# Patient Record
Sex: MALE | Race: WHITE | NOT HISPANIC OR LATINO | Employment: FULL TIME | URBAN - METROPOLITAN AREA
[De-identification: names, ages, dates, MRNs, and addresses within clinical notes are randomized per-mention and may not be internally consistent; named-entity substitution may affect disease eponyms.]

---

## 2023-04-01 ENCOUNTER — HOSPITAL ENCOUNTER (EMERGENCY)
Facility: CLINIC | Age: 23
Discharge: HOME | End: 2023-04-01
Admitting: PHYSICIAN ASSISTANT
Payer: COMMERCIAL

## 2023-04-01 VITALS
SYSTOLIC BLOOD PRESSURE: 119 MMHG | BODY MASS INDEX: 21.4 KG/M2 | OXYGEN SATURATION: 98 % | HEIGHT: 70 IN | RESPIRATION RATE: 18 BRPM | TEMPERATURE: 98.4 F | WEIGHT: 149.5 LBS | HEART RATE: 100 BPM | DIASTOLIC BLOOD PRESSURE: 63 MMHG

## 2023-04-01 DIAGNOSIS — K52.9 GASTROENTERITIS: ICD-10-CM

## 2023-04-01 LAB
BASOPHILS # BLD AUTO: 0 10E3/UL (ref 0–0.2)
BASOPHILS NFR BLD AUTO: 0 %
CA-I BLD-MCNC: 4.8 MG/DL (ref 4.4–5.2)
CPB POCT: NO
CREAT BLD-MCNC: 0.9 MG/DL (ref 0.7–1.3)
EOSINOPHIL # BLD AUTO: 0.1 10E3/UL (ref 0–0.7)
EOSINOPHIL NFR BLD AUTO: 1 %
ERYTHROCYTE [DISTWIDTH] IN BLOOD BY AUTOMATED COUNT: 12 % (ref 10–15)
GFR SERPL CREATININE-BSD FRML MDRD: >60 ML/MIN/1.73M2
GLUCOSE BLD-MCNC: 95 MG/DL (ref 70–99)
HCO3 BLDV-SCNC: 24 MMOL/L (ref 21–28)
HCT VFR BLD AUTO: 44.7 % (ref 40–53)
HCT VFR BLD CALC: 45 % (ref 40–53)
HGB BLD-MCNC: 15.3 G/DL (ref 13.3–17.7)
HGB BLD-MCNC: 15.7 G/DL (ref 13.3–17.7)
IMM GRANULOCYTES # BLD: 0 10E3/UL
IMM GRANULOCYTES NFR BLD: 0 %
LYMPHOCYTES # BLD AUTO: 0.3 10E3/UL (ref 0.8–5.3)
LYMPHOCYTES NFR BLD AUTO: 3 %
MCH RBC QN AUTO: 30.1 PG (ref 26.5–33)
MCHC RBC AUTO-ENTMCNC: 35.1 G/DL (ref 31.5–36.5)
MCV RBC AUTO: 86 FL (ref 78–100)
MONOCYTES # BLD AUTO: 0.7 10E3/UL (ref 0–1.3)
MONOCYTES NFR BLD AUTO: 6 %
NEUTROPHILS # BLD AUTO: 10.5 10E3/UL (ref 1.6–8.3)
NEUTROPHILS NFR BLD AUTO: 90 %
NRBC # BLD AUTO: 0 10E3/UL
NRBC BLD AUTO-RTO: 0 /100
PCO2 BLDV: 37 MM HG (ref 40–50)
PH BLDV: 7.42 [PH] (ref 7.32–7.43)
PLATELET # BLD AUTO: 276 10E3/UL (ref 150–450)
PO2 BLDV: 25 MM HG (ref 25–47)
POTASSIUM BLD-SCNC: 3.5 MMOL/L (ref 3.4–5.3)
RBC # BLD AUTO: 5.22 10E6/UL (ref 4.4–5.9)
SAO2 % BLDV: 48 % (ref 94–100)
SODIUM BLD-SCNC: 141 MMOL/L (ref 133–144)
WBC # BLD AUTO: 11.6 10E3/UL (ref 4–11)

## 2023-04-01 PROCEDURE — 82330 ASSAY OF CALCIUM: CPT

## 2023-04-01 PROCEDURE — 82947 ASSAY GLUCOSE BLOOD QUANT: CPT

## 2023-04-01 PROCEDURE — 85025 COMPLETE CBC W/AUTO DIFF WBC: CPT | Performed by: PHYSICIAN ASSISTANT

## 2023-04-01 PROCEDURE — 96361 HYDRATE IV INFUSION ADD-ON: CPT | Performed by: PHYSICIAN ASSISTANT

## 2023-04-01 PROCEDURE — 82565 ASSAY OF CREATININE: CPT

## 2023-04-01 PROCEDURE — 96374 THER/PROPH/DIAG INJ IV PUSH: CPT | Performed by: PHYSICIAN ASSISTANT

## 2023-04-01 PROCEDURE — 258N000003 HC RX IP 258 OP 636: Performed by: PHYSICIAN ASSISTANT

## 2023-04-01 PROCEDURE — 99284 EMERGENCY DEPT VISIT MOD MDM: CPT | Mod: 25 | Performed by: PHYSICIAN ASSISTANT

## 2023-04-01 PROCEDURE — 99284 EMERGENCY DEPT VISIT MOD MDM: CPT | Performed by: PHYSICIAN ASSISTANT

## 2023-04-01 PROCEDURE — 36415 COLL VENOUS BLD VENIPUNCTURE: CPT | Performed by: PHYSICIAN ASSISTANT

## 2023-04-01 PROCEDURE — 250N000011 HC RX IP 250 OP 636: Performed by: PHYSICIAN ASSISTANT

## 2023-04-01 RX ORDER — ONDANSETRON 2 MG/ML
4 INJECTION INTRAMUSCULAR; INTRAVENOUS ONCE
Status: COMPLETED | OUTPATIENT
Start: 2023-04-01 | End: 2023-04-01

## 2023-04-01 RX ORDER — ONDANSETRON 4 MG/1
4 TABLET, ORALLY DISINTEGRATING ORAL EVERY 8 HOURS PRN
Qty: 6 TABLET | Refills: 0 | Status: SHIPPED | OUTPATIENT
Start: 2023-04-01 | End: 2023-04-03

## 2023-04-01 RX ADMIN — SODIUM CHLORIDE 1000 ML: 9 INJECTION, SOLUTION INTRAVENOUS at 15:07

## 2023-04-01 RX ADMIN — ONDANSETRON 4 MG: 2 INJECTION INTRAMUSCULAR; INTRAVENOUS at 15:07

## 2023-04-01 ASSESSMENT — ACTIVITIES OF DAILY LIVING (ADL)
ADLS_ACUITY_SCORE: 35
ADLS_ACUITY_SCORE: 35

## 2023-04-01 NOTE — ED PROVIDER NOTES
"ED Provider Note  [facility]      History     Chief Complaint   Patient presents with     Nausea, Vomiting, & Diarrhea     Pt just got back from [place] states he thinks he has HRBoss revenGVISP 1     HPI  Tesfaye Almanza is a 22 year old male who presents emergency department tonight with concerns for nausea vomiting and diarrhea.  Patient states that 2 days ago he got back from [place], where he had been drinking alcohol, as well as may be drinking some water from the tap in [place].  He states that this morning he woke up with new onset nausea vomiting and diarrhea.  He states that he has been unable to keep any solids or liquids down and did try drinking some water and Gatorade, which he did throw up.  He states that his stool is \"pure liquid\" without any bloody or black stools.  Is not having any hematemesis with the symptoms.  He does report some more generalized abdominal pain with his symptoms particularly more upper abdominal pain with vomiting.  No fevers with the symptoms.  Denies any cough or sore throat, runny nose.  Patient denies any recent alcohol use, states that he last had alcohol about 2 days ago.  He denies chronic medical problems.             Physical Exam   BP: 114/69  Pulse: 106  Temp: 98.4  F (36.9  C)  Resp: 18  Height: 177.8 cm (5' 10\")  Weight: 67.8 kg (149 lb 8 oz)  SpO2: 99 %  Physical Exam  GENERAL APPEARANCE: The patient is well developed, well appearing, and in no acute distress.  HEAD:  Normocephalic and atraumatic.   EENT: Voice normal.  NECK: Trachea is midline.No lymphadenopathy or tenderness.  LUNGS: Breath sounds are equal and clear bilaterally. No wheezes, rhonchi, or rales.  HEART: Regular rate and normal rhythm.  Radial pulses 2+ bilaterally.  ABDOMEN: Soft, flat, and benign. No mass, tenderness, guarding, or rebound.Bowel sounds are present.  EXTREMITIES: No cyanosis, clubbing, or edema.  NEUROLOGIC: No focal sensory or motor deficits are " noted.  PSYCHIATRIC: The patient is awake, alert.  Appropriate mood and affect.  SKIN: Warm, dry, and well perfused. Good turgor.      ED Course, Procedures, & Data      Procedures                      Results for orders placed or performed during the hospital encounter of 04/01/23   CBC with platelets and differential     Status: Abnormal   Result Value Ref Range    WBC Count 11.6 (H) 4.0 - 11.0 10e3/uL    RBC Count 5.22 4.40 - 5.90 10e6/uL    Hemoglobin 15.7 13.3 - 17.7 g/dL    Hematocrit 44.7 40.0 - 53.0 %    MCV 86 78 - 100 fL    MCH 30.1 26.5 - 33.0 pg    MCHC 35.1 31.5 - 36.5 g/dL    RDW 12.0 10.0 - 15.0 %    Platelet Count 276 150 - 450 10e3/uL    % Neutrophils 90 %    % Lymphocytes 3 %    % Monocytes 6 %    % Eosinophils 1 %    % Basophils 0 %    % Immature Granulocytes 0 %    NRBCs per 100 WBC 0 <1 /100    Absolute Neutrophils 10.5 (H) 1.6 - 8.3 10e3/uL    Absolute Lymphocytes 0.3 (L) 0.8 - 5.3 10e3/uL    Absolute Monocytes 0.7 0.0 - 1.3 10e3/uL    Absolute Eosinophils 0.1 0.0 - 0.7 10e3/uL    Absolute Basophils 0.0 0.0 - 0.2 10e3/uL    Absolute Immature Granulocytes 0.0 <=0.4 10e3/uL    Absolute NRBCs 0.0 10e3/uL   iStat Gases Electrolytes ICA Glucose Venous, POCT     Status: Abnormal   Result Value Ref Range    CPB Applied No     Hematocrit POCT 45 40 - 53 %    Calcium, Ionized Whole Blood POCT 4.8 4.4 - 5.2 mg/dL    Glucose Whole Blood POCT 95 70 - 99 mg/dL    Bicarbonate Venous POCT 24 21 - 28 mmol/L    Hemoglobin POCT 15.3 13.3 - 17.7 g/dL    Potassium POCT 3.5 3.4 - 5.3 mmol/L    Sodium POCT 141 133 - 144 mmol/L    pCO2 Venous POCT 37 (L) 40 - 50 mm Hg    pO2 Venous POCT 25 25 - 47 mm Hg    pH Venous POCT 7.42 7.32 - 7.43    O2 Sat, Venous POCT 48 (L) 94 - 100 %   Creatinine POCT     Status: Normal   Result Value Ref Range    Creatinine POCT 0.9 0.7 - 1.3 mg/dL    GFR, ESTIMATED POCT >60 >60 mL/min/1.73m2   CBC with platelets differential     Status: Abnormal    Narrative    The following orders were  created for panel order CBC with platelets differential.  Procedure                               Abnormality         Status                     ---------                               -----------         ------                     CBC with platelets and d...[[id number]]  Abnormal            Final result                 Please view results for these tests on the individual orders.     Medications   0.9% sodium chloride BOLUS (0 mLs Intravenous Stopped 4/1/23 6936)   ondansetron (ZOFRAN) injection 4 mg (4 mg Intravenous $Given 4/1/23 3052)     Labs Ordered and Resulted from Time of ED Arrival to Time of ED Departure   CBC WITH PLATELETS AND DIFFERENTIAL - Abnormal       Result Value    WBC Count 11.6 (*)     RBC Count 5.22      Hemoglobin 15.7      Hematocrit 44.7      MCV 86      MCH 30.1      MCHC 35.1      RDW 12.0      Platelet Count 276      % Neutrophils 90      % Lymphocytes 3      % Monocytes 6      % Eosinophils 1      % Basophils 0      % Immature Granulocytes 0      NRBCs per 100 WBC 0      Absolute Neutrophils 10.5 (*)     Absolute Lymphocytes 0.3 (*)     Absolute Monocytes 0.7      Absolute Eosinophils 0.1      Absolute Basophils 0.0      Absolute Immature Granulocytes 0.0      Absolute NRBCs 0.0     ISTAT GASES ELECTROLYTES ICA GLUCOSE VENOUS POCT - Abnormal    CPB Applied No      Hematocrit POCT 45      Calcium, Ionized Whole Blood POCT 4.8      Glucose Whole Blood POCT 95      Bicarbonate Venous POCT 24      Hemoglobin POCT 15.3      Potassium POCT 3.5      Sodium POCT 141      pCO2 Venous POCT 37 (*)     pO2 Venous POCT 25      pH Venous POCT 7.42      O2 Sat, Venous POCT 48 (*)    ISTAT CREATININE POCT - Normal    Creatinine POCT 0.9      GFR, ESTIMATED POCT >60     ISTAT CREATININE POCT     No orders to display          Critical care was not performed.     Medical Decision Making  The patient's presentation was of moderate complexity (an acute illness with systemic symptoms).    The patient's  evaluation involved:  ordering and/or review of 3+ test(s) in this encounter (see separate area of note for details)    The patient's management necessitated moderate risk (prescription drug management including medications given in the ED).      Assessment & Plan    This is a 22-year-old male otherwise healthy presenting with concerns for nausea vomiting diarrhea starting today after recent travel to [place].  On presentation in the emergency department patient afebrile, initially tachycardic heart rate 106.  Physical exam shows soft abdomen throughout without any focal tenderness.  Has clear breath sounds.  He is not ill-appearing.  I discussed with him recommendations symptoms most consistent with gastroenteritis, possible traveler's diarrhea, possible viral etiology.  Low suspicion of gallbladder pathology, appendicitis bowel obstruction more sinister pathology.  IV was placed patient was given 1 L normal saline in addition to 4 Zofran IV.  Basic labs were obtained.  At time of patient stay the chemistry panels were not returning due to machines breaking down, i-STAT chemistry and creatinine were ordered in addition to CBC.  Patient is not having any upper abdominal pain to suggest pancreatitis therefore lipase was not ordered.CBC returns with leukocytosis at 11.6.  Metabolic function shows no electrolyte disturbance, creatinine 0.9.    On recheck patient states he feels significantly improved, he is able to tolerate liquids, applesauce at bedside.  I discussed with him I feel he is safe to discharge home.  We discussed expectations symptoms improve within the next 1 to 2 days.  To give him a short supply of Zofran to be used as needed.  We discussed having a low threshold to return if he develops any increasing abdominal pain or any other red flag signs.  Patient has no other questions or concerns at this time.  Red flag signs were addressed, and they were in agreement with the patient care plan provided.    I  have reviewed the nursing notes. I have reviewed the findings, diagnosis, plan and need for follow up with the patient.    Discharge Medication List as of 4/1/2023  5:10 PM      START taking these medications    Details   ondansetron (ZOFRAN ODT) 4 MG ODT tab Take 1 tablet (4 mg) by mouth every 8 hours as needed for nausea, Disp-6 tablet, R-0, Local Print             Final diagnoses:   Gastroenteritis       EMRE Galaviz  [facility] EMERGENCY DEPARTMENT  4/1/2023

## 2023-04-01 NOTE — DISCHARGE INSTRUCTIONS
Here in the emergency department you have reassuring evaluation today.  Your electrolytes returned normal.  Your kidney function was reassuring.  We discussed your white blood cell count was slightly high which can happen with stress, infection, or recurrent vomiting.  You have a soft abdomen today, we discussed that your symptoms appear most consistent with gastroenteritis which can be caused from a virus, potentially bacterial illness from your experience in [place], or from your heavy drinking a few days ago.  I did give you prescription for Zofran medicine to be used as needed for nausea.  We discussed small slow sips of water Gatorade Pedialyte, and expectations of symptoms to improve within the next 1 to 2 days.    If you develop any new or worsening symptoms, is important to return right away to the emergency department for further evaluation and management.

## 2023-04-01 NOTE — ED TRIAGE NOTES
Triage Assessment     Row Name 04/01/23 140       Triage Assessment (Adult)    Airway WDL WDL       Respiratory WDL    Respiratory WDL WDL       Skin Circulation/Temperature WDL    Skin Circulation/Temperature WDL WDL       Cardiac WDL    Cardiac WDL WDL       Peripheral/Neurovascular WDL    Peripheral Neurovascular WDL WDL       Cognitive/Neuro/Behavioral WDL    Cognitive/Neuro/Behavioral WDL WDL

## 2023-07-02 ENCOUNTER — APPOINTMENT (OUTPATIENT)
Dept: ULTRASOUND IMAGING | Facility: CLINIC | Age: 23
End: 2023-07-02
Attending: NURSE PRACTITIONER
Payer: COMMERCIAL

## 2023-07-02 ENCOUNTER — APPOINTMENT (OUTPATIENT)
Dept: CT IMAGING | Facility: CLINIC | Age: 23
End: 2023-07-02
Attending: NURSE PRACTITIONER
Payer: COMMERCIAL

## 2023-07-02 ENCOUNTER — HOSPITAL ENCOUNTER (EMERGENCY)
Facility: CLINIC | Age: 23
Discharge: HOME | End: 2023-07-02
Attending: EMERGENCY MEDICINE | Admitting: EMERGENCY MEDICINE
Payer: COMMERCIAL

## 2023-07-02 VITALS
RESPIRATION RATE: 16 BRPM | HEIGHT: 71 IN | OXYGEN SATURATION: 100 % | BODY MASS INDEX: 20.33 KG/M2 | SYSTOLIC BLOOD PRESSURE: 116 MMHG | WEIGHT: 145.2 LBS | DIASTOLIC BLOOD PRESSURE: 80 MMHG | TEMPERATURE: 98.8 F | HEART RATE: 88 BPM

## 2023-07-02 DIAGNOSIS — R11.2 NAUSEA AND VOMITING, UNSPECIFIED VOMITING TYPE: ICD-10-CM

## 2023-07-02 DIAGNOSIS — R10.9 ABDOMINAL PAIN, UNSPECIFIED ABDOMINAL LOCATION: ICD-10-CM

## 2023-07-02 LAB
ALBUMIN SERPL BCG-MCNC: 4.7 G/DL (ref 3.5–5.2)
ALBUMIN UR-MCNC: NEGATIVE MG/DL
ALP SERPL-CCNC: 95 U/L (ref 40–129)
ALT SERPL W P-5'-P-CCNC: 23 U/L (ref 0–70)
ANION GAP SERPL CALCULATED.3IONS-SCNC: 11 MMOL/L (ref 7–15)
APPEARANCE UR: CLEAR
AST SERPL W P-5'-P-CCNC: 33 U/L (ref 0–45)
BASOPHILS # BLD AUTO: 0 10E3/UL (ref 0–0.2)
BASOPHILS NFR BLD AUTO: 0 %
BILIRUB SERPL-MCNC: 2.8 MG/DL
BILIRUB UR QL STRIP: NEGATIVE
BUN SERPL-MCNC: 15.1 MG/DL (ref 6–20)
CALCIUM SERPL-MCNC: 9.2 MG/DL (ref 8.6–10)
CHLORIDE SERPL-SCNC: 101 MMOL/L (ref 98–107)
COLOR UR AUTO: YELLOW
CREAT SERPL-MCNC: 0.91 MG/DL (ref 0.67–1.17)
DEPRECATED HCO3 PLAS-SCNC: 26 MMOL/L (ref 22–29)
EOSINOPHIL # BLD AUTO: 0 10E3/UL (ref 0–0.7)
EOSINOPHIL NFR BLD AUTO: 0 %
ERYTHROCYTE [DISTWIDTH] IN BLOOD BY AUTOMATED COUNT: 12.2 % (ref 10–15)
GFR SERPL CREATININE-BSD FRML MDRD: >90 ML/MIN/1.73M2
GLUCOSE SERPL-MCNC: 102 MG/DL (ref 70–99)
GLUCOSE UR STRIP-MCNC: NEGATIVE MG/DL
HCT VFR BLD AUTO: 47.5 % (ref 40–53)
HGB BLD-MCNC: 16.9 G/DL (ref 13.3–17.7)
HGB UR QL STRIP: NEGATIVE
HOLD SPECIMEN: 0
HOLD SPECIMEN: NORMAL
IMM GRANULOCYTES # BLD: 0.1 10E3/UL
IMM GRANULOCYTES NFR BLD: 1 %
KETONES UR STRIP-MCNC: ABNORMAL MG/DL
LEUKOCYTE ESTERASE UR QL STRIP: NEGATIVE
LIPASE SERPL-CCNC: 23 U/L (ref 13–60)
LYMPHOCYTES # BLD AUTO: 0.4 10E3/UL (ref 0.8–5.3)
LYMPHOCYTES NFR BLD AUTO: 3 %
MCH RBC QN AUTO: 30.9 PG (ref 26.5–33)
MCHC RBC AUTO-ENTMCNC: 35.6 G/DL (ref 31.5–36.5)
MCV RBC AUTO: 87 FL (ref 78–100)
MONOCYTES # BLD AUTO: 0.8 10E3/UL (ref 0–1.3)
MONOCYTES NFR BLD AUTO: 6 %
MUCOUS THREADS #/AREA URNS LPF: PRESENT /LPF
NEUTROPHILS # BLD AUTO: 12.5 10E3/UL (ref 1.6–8.3)
NEUTROPHILS NFR BLD AUTO: 90 %
NITRATE UR QL: NEGATIVE
NRBC # BLD AUTO: 0 10E3/UL
NRBC BLD AUTO-RTO: 0 /100
PH UR STRIP: 6 [PH] (ref 5–7)
PLATELET # BLD AUTO: 316 10E3/UL (ref 150–450)
POTASSIUM SERPL-SCNC: 4.1 MMOL/L (ref 3.4–5.3)
PROT SERPL-MCNC: 7.4 G/DL (ref 6.4–8.3)
RBC # BLD AUTO: 5.47 10E6/UL (ref 4.4–5.9)
RBC URINE: <1 /HPF
SODIUM SERPL-SCNC: 138 MMOL/L (ref 136–145)
SP GR UR STRIP: 1.02 (ref 1–1.03)
UROBILINOGEN UR STRIP-MCNC: 0.2 MG/DL
WBC # BLD AUTO: 13.9 10E3/UL (ref 4–11)
WBC URINE: <1 /HPF

## 2023-07-02 PROCEDURE — 81001 URINALYSIS AUTO W/SCOPE: CPT | Performed by: NURSE PRACTITIONER

## 2023-07-02 PROCEDURE — 250N000009 HC RX 250: Performed by: EMERGENCY MEDICINE

## 2023-07-02 PROCEDURE — 258N000003 HC RX IP 258 OP 636: Performed by: NURSE PRACTITIONER

## 2023-07-02 PROCEDURE — 250N000011 HC RX IP 250 OP 636: Mod: JZ | Performed by: NURSE PRACTITIONER

## 2023-07-02 PROCEDURE — 85025 COMPLETE CBC W/AUTO DIFF WBC: CPT | Performed by: EMERGENCY MEDICINE

## 2023-07-02 PROCEDURE — 76705 ECHO EXAM OF ABDOMEN: CPT

## 2023-07-02 PROCEDURE — 80053 COMPREHEN METABOLIC PANEL: CPT | Performed by: EMERGENCY MEDICINE

## 2023-07-02 PROCEDURE — 99284 EMERGENCY DEPT VISIT MOD MDM: CPT | Performed by: EMERGENCY MEDICINE

## 2023-07-02 PROCEDURE — 99285 EMERGENCY DEPT VISIT HI MDM: CPT | Mod: 25 | Performed by: EMERGENCY MEDICINE

## 2023-07-02 PROCEDURE — 80053 COMPREHEN METABOLIC PANEL: CPT | Performed by: FAMILY MEDICINE

## 2023-07-02 PROCEDURE — 250N000011 HC RX IP 250 OP 636: Mod: JZ | Performed by: EMERGENCY MEDICINE

## 2023-07-02 PROCEDURE — 74177 CT ABD & PELVIS W/CONTRAST: CPT

## 2023-07-02 PROCEDURE — 85025 COMPLETE CBC W/AUTO DIFF WBC: CPT | Performed by: FAMILY MEDICINE

## 2023-07-02 PROCEDURE — 83690 ASSAY OF LIPASE: CPT | Performed by: NURSE PRACTITIONER

## 2023-07-02 PROCEDURE — 250N000013 HC RX MED GY IP 250 OP 250 PS 637: Performed by: NURSE PRACTITIONER

## 2023-07-02 PROCEDURE — 96361 HYDRATE IV INFUSION ADD-ON: CPT | Performed by: EMERGENCY MEDICINE

## 2023-07-02 PROCEDURE — 96374 THER/PROPH/DIAG INJ IV PUSH: CPT | Mod: 59 | Performed by: EMERGENCY MEDICINE

## 2023-07-02 PROCEDURE — 36415 COLL VENOUS BLD VENIPUNCTURE: CPT | Performed by: FAMILY MEDICINE

## 2023-07-02 RX ORDER — ONDANSETRON 4 MG/1
4 TABLET, ORALLY DISINTEGRATING ORAL EVERY 6 HOURS PRN
Qty: 20 TABLET | Refills: 0 | Status: SHIPPED | OUTPATIENT
Start: 2023-07-02

## 2023-07-02 RX ORDER — MAGNESIUM HYDROXIDE/ALUMINUM HYDROXICE/SIMETHICONE 120; 1200; 1200 MG/30ML; MG/30ML; MG/30ML
15 SUSPENSION ORAL ONCE
Status: COMPLETED | OUTPATIENT
Start: 2023-07-02 | End: 2023-07-02

## 2023-07-02 RX ORDER — ONDANSETRON 2 MG/ML
4 INJECTION INTRAMUSCULAR; INTRAVENOUS EVERY 30 MIN PRN
Status: DISCONTINUED | OUTPATIENT
Start: 2023-07-02 | End: 2023-07-02 | Stop reason: HOSPADM

## 2023-07-02 RX ORDER — IOPAMIDOL 755 MG/ML
100 INJECTION, SOLUTION INTRAVASCULAR ONCE
Status: COMPLETED | OUTPATIENT
Start: 2023-07-02 | End: 2023-07-02

## 2023-07-02 RX ADMIN — SODIUM CHLORIDE 58 ML: 9 INJECTION, SOLUTION INTRAVENOUS at 16:27

## 2023-07-02 RX ADMIN — ALUMINUM HYDROXIDE, MAGNESIUM HYDROXIDE, AND SIMETHICONE 15 ML: 200; 200; 20 SUSPENSION ORAL at 14:09

## 2023-07-02 RX ADMIN — SODIUM CHLORIDE 1000 ML: 9 INJECTION, SOLUTION INTRAVENOUS at 14:09

## 2023-07-02 RX ADMIN — SODIUM CHLORIDE, POTASSIUM CHLORIDE, SODIUM LACTATE AND CALCIUM CHLORIDE 1000 ML: 600; 310; 30; 20 INJECTION, SOLUTION INTRAVENOUS at 15:25

## 2023-07-02 RX ADMIN — IOPAMIDOL 64 ML: 755 INJECTION, SOLUTION INTRAVENOUS at 16:14

## 2023-07-02 RX ADMIN — ONDANSETRON 4 MG: 2 INJECTION INTRAMUSCULAR; INTRAVENOUS at 14:09

## 2023-07-02 ASSESSMENT — ACTIVITIES OF DAILY LIVING (ADL)
ADLS_ACUITY_SCORE: 35
ADLS_ACUITY_SCORE: 35

## 2023-07-02 NOTE — ED TRIAGE NOTES
He states he was here 3.5 months ago for abdominal pain and vomiting. He states he is having consistent nausea now, he states at work today he couldn't stop throwing up and feels like there a rock in his stomach. He states he can barely eat due to the nausea/vomiting. His family has a history of having their gallbladders removed and thinks it might be that.      Triage Assessment     Row Name 07/02/23 1151       Triage Assessment (Adult)    Airway WDL WDL       Respiratory WDL    Respiratory WDL WDL       Skin Circulation/Temperature WDL    Skin Circulation/Temperature WDL WDL       Cardiac WDL    Cardiac WDL WDL       Peripheral/Neurovascular WDL    Peripheral Neurovascular WDL WDL       Cognitive/Neuro/Behavioral WDL    Cognitive/Neuro/Behavioral WDL WDL

## 2023-07-02 NOTE — ED PROVIDER NOTES
[facility] EMERGENCY DEPARTMENT ([facility])    7/02/23      ED PROVIDER NOTE        History     Chief Complaint   Patient presents with     Abdominal Pain     Nausea & Vomiting     He states he was here 3.5 months ago for abdominal pain and vomiting. He states he is having consistent nausea now, he states at work today he couldn't stop throwing up and feels like there a rock in his stomach. He states he can barely eat due to the nausea/vomiting. His family has a history of having their gallbladders removed and thinks it might be that.     HPI  Tesfaye Almanza is a 23 year old male with a past medical history significant for gastroenteritis who presents to the emergency department for evaluation of nausea, vomiting and abdominal pain. He reports since he was seen this past April he has had chronic nausea. He reports he does have episodes where his nausea worsens and he feels as if he has a rock in his stomach. He reports over the last 24 hours he has had worsening nausea, vomiting and difficulty eating. He reports 1-2 episodes of diarrhea over the last 3 months as well.    Denies fevers, or chills.    Per chart review, patient was seen on 4/1/2023 at [facility] ED for symptoms of nausea, vomiting, and diarrhea.  At this time, patient had just returned from a trip to [place] and was concerned that he hadSmartCloud revenge.  Patient was given 1 L of normal saline and 4 Zofran IV.  After this, patient felt improved and was discharged with 4 mg Zofran tablets.    Past Medical History  No past medical history on file.  No past surgical history on file.  omeprazole (PRILOSEC) 20 MG DR capsule  ondansetron (ZOFRAN ODT) 4 MG ODT tab      No Known Allergies  Family History  No family history on file.  Social History          A medically appropriate review of systems was performed with pertinent positives and negatives noted in the HPI, and all other systems negative.    Physical Exam   BP:  "112/80  Pulse: 109  Temp: 99  F (37.2  C)  Resp: 16  Height: 180.3 cm (5' 11\")  Weight: 65.9 kg (145 lb 3.2 oz)  SpO2: 97 %  Physical Exam  Vitals and nursing note reviewed.   HENT:      Head: Normocephalic and atraumatic.   Cardiovascular:      Rate and Rhythm: Normal rate and regular rhythm.      Heart sounds: Normal heart sounds.   Pulmonary:      Effort: Pulmonary effort is normal.      Breath sounds: Normal breath sounds.   Abdominal:      General: Abdomen is flat.      Palpations: Abdomen is soft.      Tenderness: There is abdominal tenderness in the right upper quadrant. There is no right CVA tenderness, left CVA tenderness, guarding or rebound. Negative signs include Gonzalez's sign.      Comments: Mild right sided abdominal pan without guarding or rebound.   Skin:     General: Skin is dry.   Neurological:      Mental Status: He is oriented to person, place, and time.   Psychiatric:         Mood and Affect: Mood normal.           ED Course, Procedures, & Data      Procedures             Results for orders placed or performed during the hospital encounter of 07/02/23   Abdomen US, limited (RUQ only)     Status: None    Narrative    EXAM: US ABDOMEN LIMITED  LOCATION: [facility]  DATE: 7/2/2023    INDICATION: 3 months intermittent nausea, and vomiting  COMPARISON: None.  TECHNIQUE: Limited abdominal ultrasound.    FINDINGS:    GALLBLADDER: Small polyps are noted within the gallbladder measuring up to 3 mm. No stones or sludge are noted within the gallbladder. Sonographic sign is negative.    BILE DUCTS: No biliary dilatation. The common duct measures 2 mm.    LIVER: Normal parenchyma with smooth contour. No focal mass.    RIGHT KIDNEY: No hydronephrosis.    PANCREAS: The visualized portions are normal.    No ascites.      Impression    IMPRESSION:  1.  Small follicles are noted within the gallbladder measuring up to 3 mm which are benign and needs no further " follow-up. Otherwise unremarkable right upper quadrant ultrasound.       CT Abdomen Pelvis w Contrast     Status: None    Narrative    EXAM: CT ABDOMEN PELVIS W CONTRAST  LOCATION: [facility]  DATE: 7/2/2023    INDICATION: Nausea, abdominal pain approximately 3 months.  COMPARISON: Ultrasound 07/10/2023  TECHNIQUE: CT scan of the abdomen and pelvis was performed following injection of IV contrast. Multiplanar reformats were obtained. Dose reduction techniques were used.  CONTRAST: 64mL isovue 370    FINDINGS:   LOWER CHEST: Normal.    HEPATOBILIARY: Smooth contour the surface of liver. No suspicious focal hepatic lesion. No calcific cholelithiasis or biliary ductal dilatation.    PANCREAS: Normal.    SPLEEN: Normal.    ADRENAL GLANDS: Normal.    KIDNEYS/BLADDER: Symmetric enhancement of both kidneys. No hydronephrosis. Partially distended urinary bladder is unremarkable.    BOWEL: Small and large bowel loops are normal in caliber without obstruction. There short segment of nondilated small bowel in the anterior pelvis with intraluminal ill-defined high density without upstream small bowel dilatation series 5-157 appendix is   normal.    No free fluid or pneumoperitoneum.    LYMPH NODES: No abdominal or pelvic lymphadenopathy.    VASCULATURE: Abdominal aorta normal in caliber. Major portal veins are patent.    PELVIC ORGANS: Unremarkable.    MUSCULOSKELETAL: Multilevel level endplate Schmorl's node deformities. No focal destructive osseous lesion.      Impression    IMPRESSION:   1.  No CT evidence of acute abdominal or pelvic process.  2.  Intraluminal high density in the distal small bowel loops in the anterior pelvis is likely due to ingested contents. No upstream small bowel dilatation or perienteric edema to suggest obstruction.  3.  Normal appendix.   Ollie Draw     Status: None    Narrative    The following orders were created for panel order Ollie  Draw.  Procedure                               Abnormality         Status                     ---------                               -----------         ------                     Extra Blue Top Tube[[id number]]                              Final result               Extra Red Top Tube[[id number]]                               Final result               Extra Green Top (Lithium...[[id number]]                      Final result               Extra Purple Top Tube[[id number]]                            Final result                 Please view results for these tests on the individual orders.   Extra Blue Top Tube     Status: None   Result Value Ref Range    Hold Specimen JIC    Extra Red Top Tube     Status: None   Result Value Ref Range    Hold Specimen JIC    Extra Green Top (Lithium Heparin) Tube     Status: None   Result Value Ref Range    Hold Specimen jic    Extra Purple Top Tube     Status: None   Result Value Ref Range    Hold Specimen 0    Comprehensive metabolic panel     Status: Abnormal   Result Value Ref Range    Sodium 138 136 - 145 mmol/L    Potassium 4.1 3.4 - 5.3 mmol/L    Chloride 101 98 - 107 mmol/L    Carbon Dioxide (CO2) 26 22 - 29 mmol/L    Anion Gap 11 7 - 15 mmol/L    Urea Nitrogen 15.1 6.0 - 20.0 mg/dL    Creatinine 0.91 0.67 - 1.17 mg/dL    Calcium 9.2 8.6 - 10.0 mg/dL    Glucose 102 (H) 70 - 99 mg/dL    Alkaline Phosphatase 95 40 - 129 U/L    AST 33 0 - 45 U/L    ALT 23 0 - 70 U/L    Protein Total 7.4 6.4 - 8.3 g/dL    Albumin 4.7 3.5 - 5.2 g/dL    Bilirubin Total 2.8 (H) <=1.2 mg/dL    GFR Estimate >90 >60 mL/min/1.73m2   CBC with platelets and differential     Status: Abnormal   Result Value Ref Range    WBC Count 13.9 (H) 4.0 - 11.0 10e3/uL    RBC Count 5.47 4.40 - 5.90 10e6/uL    Hemoglobin 16.9 13.3 - 17.7 g/dL    Hematocrit 47.5 40.0 - 53.0 %    MCV 87 78 - 100 fL    MCH 30.9 26.5 - 33.0 pg    MCHC 35.6 31.5 - 36.5 g/dL    RDW 12.2 10.0 - 15.0 %    Platelet Count 316 150 - 450 10e3/uL    %  Neutrophils 90 %    % Lymphocytes 3 %    % Monocytes 6 %    % Eosinophils 0 %    % Basophils 0 %    % Immature Granulocytes 1 %    NRBCs per 100 WBC 0 <1 /100    Absolute Neutrophils 12.5 (H) 1.6 - 8.3 10e3/uL    Absolute Lymphocytes 0.4 (L) 0.8 - 5.3 10e3/uL    Absolute Monocytes 0.8 0.0 - 1.3 10e3/uL    Absolute Eosinophils 0.0 0.0 - 0.7 10e3/uL    Absolute Basophils 0.0 0.0 - 0.2 10e3/uL    Absolute Immature Granulocytes 0.1 <=0.4 10e3/uL    Absolute NRBCs 0.0 10e3/uL   Lipase     Status: Normal   Result Value Ref Range    Lipase 23 13 - 60 U/L   UA with Microscopic reflex to Culture     Status: Abnormal    Specimen: Urine, Clean Catch   Result Value Ref Range    Color Urine Yellow Colorless, Straw, Light Yellow, Yellow    Appearance Urine Clear Clear    Glucose Urine Negative Negative mg/dL    Bilirubin Urine Negative Negative    Ketones Urine Trace (A) Negative mg/dL    Specific Gravity Urine 1.020 1.003 - 1.035    Blood Urine Negative Negative    pH Urine 6.0 5.0 - 7.0    Protein Albumin Urine Negative Negative mg/dL    Urobilinogen Urine 0.2 0.2, 1.0 mg/dL    Nitrite Urine Negative Negative    Leukocyte Esterase Urine Negative Negative    Mucus Urine Present (A) None Seen /LPF    RBC Urine <1 <=2 /HPF    WBC Urine <1 <=5 /HPF    Narrative    Urine Culture not indicated   CBC with Platelets & Differential     Status: Abnormal    Narrative    The following orders were created for panel order CBC with Platelets & Differential.  Procedure                               Abnormality         Status                     ---------                               -----------         ------                     CBC with platelets and d...[[id number]]  Abnormal            Final result                 Please view results for these tests on the individual orders.     Medications   ondansetron (ZOFRAN) injection 4 mg (4 mg Intravenous $Given 7/2/23 4338)   0.9% sodium chloride BOLUS (0 mLs Intravenous Stopped 7/2/23 8390)   alum  & mag hydroxide-simethicone (MAALOX) suspension 15 mL (15 mLs Oral $Given 7/2/23 1409)   lactated ringers BOLUS 1,000 mL (0 mLs Intravenous Stopped 7/2/23 1625)   iopamidol (ISOVUE-370) solution 100 mL (64 mLs Intravenous $Given 7/2/23 1614)   sodium chloride 100mL for CT scan flush use (58 mLs Intravenous $Given 7/2/23 1627)     Labs Ordered and Resulted from Time of ED Arrival to Time of ED Departure   COMPREHENSIVE METABOLIC PANEL - Abnormal       Result Value    Sodium 138      Potassium 4.1      Chloride 101      Carbon Dioxide (CO2) 26      Anion Gap 11      Urea Nitrogen 15.1      Creatinine 0.91      Calcium 9.2      Glucose 102 (*)     Alkaline Phosphatase 95      AST 33      ALT 23      Protein Total 7.4      Albumin 4.7      Bilirubin Total 2.8 (*)     GFR Estimate >90     CBC WITH PLATELETS AND DIFFERENTIAL - Abnormal    WBC Count 13.9 (*)     RBC Count 5.47      Hemoglobin 16.9      Hematocrit 47.5      MCV 87      MCH 30.9      MCHC 35.6      RDW 12.2      Platelet Count 316      % Neutrophils 90      % Lymphocytes 3      % Monocytes 6      % Eosinophils 0      % Basophils 0      % Immature Granulocytes 1      NRBCs per 100 WBC 0      Absolute Neutrophils 12.5 (*)     Absolute Lymphocytes 0.4 (*)     Absolute Monocytes 0.8      Absolute Eosinophils 0.0      Absolute Basophils 0.0      Absolute Immature Granulocytes 0.1      Absolute NRBCs 0.0     ROUTINE UA WITH MICROSCOPIC REFLEX TO CULTURE - Abnormal    Color Urine Yellow      Appearance Urine Clear      Glucose Urine Negative      Bilirubin Urine Negative      Ketones Urine Trace (*)     Specific Gravity Urine 1.020      Blood Urine Negative      pH Urine 6.0      Protein Albumin Urine Negative      Urobilinogen Urine 0.2      Nitrite Urine Negative      Leukocyte Esterase Urine Negative      Mucus Urine Present (*)     RBC Urine <1      WBC Urine <1     LIPASE - Normal    Lipase 23       CT Abdomen Pelvis w Contrast   Final Result   IMPRESSION:     1.  No CT evidence of acute abdominal or pelvic process.   2.  Intraluminal high density in the distal small bowel loops in the anterior pelvis is likely due to ingested contents. No upstream small bowel dilatation or perienteric edema to suggest obstruction.   3.  Normal appendix.      Abdomen US, limited (RUQ only)   Final Result   IMPRESSION:   1.  Small follicles are noted within the gallbladder measuring up to 3 mm which are benign and needs no further follow-up. Otherwise unremarkable right upper quadrant ultrasound.                   Critical care was not performed.     Medical Decision Making  The patient's presentation was of high complexity (a chronic illness severe exacerbation, progression, or side effect of treatment).    The patient's evaluation involved:  review of external note(s) from 1 sources (previous ED summary)  ordering and/or review of 3+ test(s) in this encounter (see separate area of note for details)  review of 1 test result(s) ordered prior to this encounter (CBC)  independent interpretation of testing performed by another health professional (CTAP)    The patient's management necessitated moderate risk (prescription drug management including medications given in the ED).      Assessment & Plan    23 year old male who presents acute on chronic nausea, vomiting and abdominal pain.  This has been an ongoing issue for the past 3 to 4 months.  Reports last 24 hours significant worsening of his symptoms.    Clinically, patient appears nontoxic and in no acute distress. Vital signs without tachycardia, or hypoxia or fever. Otherwise on examination, he had very mild abdominal tenderness in the right upper quadrant, without Gonzalez sign, rebound or guarding.  Lungs were clear to auscultation and cardiac exam negative for murmurs.  Denies any recent heavy alcohol use, or marijuana use.    Ddx includes, but not limited to, gastritis, acute on chronic abdominal pain, chronic vomiting    IV placed,  and a CBC, CMP, and lipase drawn, and was given a saline bolus in addition to a liter of lactated Ringer's, in addition to Zofran and a GI cocktail. This did improve his nausea symptoms.    CBC was fairly unremarkable, mild leukocytosis of 13.9, do suspect this is from stress reaction to vomiting.  CMP and lipase were unremarkable.  Also obtained a UA that showed no signs of infection.  Trace amount of ketones.  Initially  obtained a right upper quadrant ultrasound, which showed no signs of cholelithiasis or cholecystitis.  I discussed with patient and mother about obtaining an abdominal CT.  I personally reviewed the images of his abdominal CT.  At this time there is no signs of any acute abdominal process, such as pancreatitis, cholecystitis, or obstruction.    I discussed with patient and his mother, about his work-up today.  That I am unsure as to the reason for his chronic nausea and vomiting, but I do feel he is safe to discharge home with primary care follow-up.  He reports he does not have a current primary care clinic. Will place referral for this.  Encouraged him to follow-up with them to discuss further work-up for his chronic nausea.  Did discuss starting a PPI for possible reflux, as well as a small prescription for Zofran for his nausea.    I reviewed the nursing notes. I have reviewed the findings, diagnosis, plan and need for follow up with the patient and his mother.    Discharge Medication List as of 7/2/2023  5:31 PM      START taking these medications    Details   omeprazole (PRILOSEC) 20 MG DR capsule Take 1 capsule (20 mg) by mouth daily, Disp-30 capsule, R-0, Local Print      ondansetron (ZOFRAN ODT) 4 MG ODT tab Take 1 tablet (4 mg) by mouth every 6 hours as needed for nausea, Disp-20 tablet, R-0, Local Print             Final diagnoses:   Nausea and vomiting, unspecified vomiting type   Abdominal pain, unspecified abdominal location       YOVANI Jimenez [facility]  EMERGENCY DEPARTMENT  7/2/2023     Harmeet Valladares APRN CNP  07/02/23 8083       Marta Munguia MD  07/05/23 0108

## 2023-07-02 NOTE — DISCHARGE INSTRUCTIONS
TODAY'S VISIT:  - If you had any labs or imaging/radiology tests performed today, you should also discuss these tests with your usual provider.     - Emergency Department testing is focused on the potential causes of your symptoms that are the most dangerous possibilities and cannot cover every possibility. Based on the evaluation, it was deemed sufficiently safe to discharge and continue management through the clinics. Thus, follow-up is very important to assess for improvement/worsening, potential further testing, and potential treatment adjustments.     FOLLOW-UP:  Please make an appointment to follow up with:  - Your Primary Care Provider to discuss next steps for your nausea. A referral was placed for this.    - Have your provider review the results from today's visit with you again to make sure no further follow-up or additional testing is needed based on those results.     PRESCRIPTIONS / MEDICATIONS:  - Prilosec every morning before breakfast  - Zofran every 6 hours as needed for nausea.    RETURN TO THE EMERGENCY DEPARTMENT  - Return to the Emergency Department at any time for any new or worsening symptoms or any concerns.

## 2024-07-07 ENCOUNTER — OFFICE VISIT (OUTPATIENT)
Dept: URGENT CARE | Facility: URGENT CARE | Age: 24
End: 2024-07-07
Payer: COMMERCIAL

## 2024-07-07 VITALS
RESPIRATION RATE: 18 BRPM | DIASTOLIC BLOOD PRESSURE: 74 MMHG | TEMPERATURE: 98.4 F | SYSTOLIC BLOOD PRESSURE: 122 MMHG | HEART RATE: 71 BPM | OXYGEN SATURATION: 100 %

## 2024-07-07 DIAGNOSIS — R09.89 TONSIL PAIN: ICD-10-CM

## 2024-07-07 DIAGNOSIS — J03.90 EXUDATIVE TONSILLITIS: Primary | ICD-10-CM

## 2024-07-07 LAB — DEPRECATED S PYO AG THROAT QL EIA: NEGATIVE

## 2024-07-07 PROCEDURE — 87651 STREP A DNA AMP PROBE: CPT | Performed by: PHYSICIAN ASSISTANT

## 2024-07-07 PROCEDURE — 99203 OFFICE O/P NEW LOW 30 MIN: CPT | Performed by: PHYSICIAN ASSISTANT

## 2024-07-07 RX ORDER — IBUPROFEN 800 MG/1
800 TABLET, FILM COATED ORAL EVERY 8 HOURS PRN
Qty: 40 TABLET | Refills: 0 | Status: SHIPPED | OUTPATIENT
Start: 2024-07-07

## 2024-07-07 RX ORDER — AMOXICILLIN 875 MG
875 TABLET ORAL 2 TIMES DAILY
Qty: 20 TABLET | Refills: 0 | Status: SHIPPED | OUTPATIENT
Start: 2024-07-07 | End: 2024-07-17

## 2024-07-07 NOTE — PATIENT INSTRUCTIONS
(J03.90) Exudative tonsillitis  (primary encounter diagnosis)  Comment:   Plan: amoxicillin (AMOXIL) 875 MG tablet, Symptomatic        COVID-19 Virus (Coronavirus) by PCR, ibuprofen         (ADVIL/MOTRIN) 800 MG tablet            (R09.89) Tonsil pain  Comment:   Plan: Streptococcus A Rapid Screen w/Reflex to PCR -         Clinic Collect, Group A Streptococcus PCR         Throat Swab, ibuprofen (ADVIL/MOTRIN) 800 MG         tablet            Considered potentially contagious for the first 24 hours of the antibiotic.    If the Covid is positive and strep is negative, stop the amoxicillin.   Follow CDC guidance if Covid is positive.

## 2024-07-07 NOTE — PROGRESS NOTES
Patient presents with:  Pharyngitis: Per/patient more of tonsil area that hurts    (J03.90) Exudative tonsillitis  (primary encounter diagnosis)  Comment:   Plan: amoxicillin (AMOXIL) 875 MG tablet,  ibuprofen         (ADVIL/MOTRIN) 800 MG tablet            (R09.89) Tonsil pain  Comment:   Plan: Streptococcus A Rapid Screen w/Reflex to PCR -         Clinic Collect, Group A Streptococcus PCR         Throat Swab, ibuprofen (ADVIL/MOTRIN) 800 MG         tablet            Considered potentially contagious for the first 24 hours of the antibiotic.    If the Covid is positive and strep is negative, stop the amoxicillin.   Follow CDC guidance if Covid is positive.    Patient to do home COVID testing.              At the end of the encounter, I discussed results, diagnosis, medications. Discussed red flags for immediate return to clinic/ER, as well as indications for follow up if no improvement. Patient understood and agreed to plan. Patient was stable for discharge     If not improving or if condition worsens, follow up with your Primary Care Provider        SUBJECTIVE:   Tesfaye Almanza is a 24 year old male who presents today with exquisite left tonsil pain since last night.  Denies any fevers, denies any sore throat.  Denies any cough.  He denies any recent ill contacts.  He denies any injury.    No past medical history on file.      Current Outpatient Medications   Medication Sig Dispense Refill    Multiple Vitamins-Iron (DAILY-NANCY/IRON/BETA-CAROTENE) TABS TAKE 1 TABLET BY MOUTH DAILY. (Patient not taking: Reported on 10/19/2020) 30 tablet 7     Social History     Tobacco Use    Smoking status: Never Smoker    Smokeless tobacco: Never Used   Substance Use Topics    Alcohol use: Not on file     Family History   Problem Relation Age of Onset    Diabetes Mother     Diabetes Father          ROS:    10 point ROS of systems including Constitutional, Eyes, Respiratory, Cardiovascular, Gastroenterology, Genitourinary,  Integumentary, Muscularskeletal, Psychiatric ,neurological were all negative except for pertinent positives noted in my HPI       OBJECTIVE:  /74   Pulse 71   Temp 98.4  F (36.9  C)   Resp 18   SpO2 100%   Physical Exam:  GENERAL APPEARANCE: healthy, alert and no distress  EYES: EOMI,  PERRL, conjunctiva clear  HENT: ear canals and TM's normal.  Nose with slightly boggy turbinates.  OP with erythematous tonsils with scant exudate.    NECK: supple, nontender, no lymphadenopathy  RESP: lungs clear to auscultation - no rales, rhonchi or wheezes  CV: regular rates and rhythm, normal S1 S2, no murmur noted  SKIN: no suspicious lesions or rashes    Results for orders placed or performed in visit on 07/07/24   Streptococcus A Rapid Screen w/Reflex to PCR - Clinic Collect     Status: Normal    Specimen: Throat; Swab   Result Value Ref Range    Group A Strep antigen Negative Negative

## 2024-07-08 LAB — GROUP A STREP BY PCR: NOT DETECTED
